# Patient Record
(demographics unavailable — no encounter records)

---

## 2025-01-09 NOTE — PHYSICAL EXAM
[TextEntry] : Dorsalis pedis and posterior tibial pulses were palpable and equal bilat.  The capillary return was instant bilat.  The lateral aspect of the left hallux nail is incurvated.  There is pain upon direct palpation of the area.  No pus or discharge is noted.  There is slight erythema in the lateral nail fold.

## 2025-01-09 NOTE — HISTORY OF PRESENT ILLNESS
[FreeTextEntry1] : Precious Terrell is a 45-year-old female patient who presents today for initial visit complaining of pain in her left great toe. She states that it has been like that for a while.  She usually is able to cut it out herself, but she noticed the toe getting a little red and did not want to have an infection.  She denies additional nail problems.  She states that usually her feet are okay.

## 2025-01-09 NOTE — ASSESSMENT
[FreeTextEntry1] : Assessment: Ingrown toenail, left hallux, lateral aspect.  Plan:   I performed straightback procedures utilizing a 12.7 cm sterile box lock, double spring fine cut back D tip fine tip stainless steel nail splitter removing the medial and lateral borders of both the left and right hallux nails as far back as tolerable and applied Amerigel wound gel with sterile compression dressings. The patient was instructed to start soaking the feet in lukewarm water and Epsom salts, 2 tablespoons per pint for 20 minutes twice per day.  The patient was advised to dry the feet with a clean towel and then apply a sterile dressing to the area for the next 5 days.  I advised the patient to notify the office right away if increased redness, swelling, pain, open wounds or discharge were observed.  PTR:  4-6 weeks or as needed.